# Patient Record
(demographics unavailable — no encounter records)

---

## 2024-11-14 NOTE — DISCUSSION/SUMMARY
[FreeTextEntry1] : Symptomatic treatment of fever and/or pain discussed Stat strep test ordered Throat culture, if POSITIVE, give Amoxicillin 400/5 1 tsp BID x 10 days Hydrate well Handwashing and infection control discussed Return to office if symptoms persist, worsen or fevers persist

## 2024-11-14 NOTE — PHYSICAL EXAM
[NL] : warm, clear [Exudate] : no exudate [de-identified] : mild erythema [de-identified] : L AC LN non tender

## 2024-11-14 NOTE — HISTORY OF PRESENT ILLNESS
[de-identified] : per dad pt has been having fevers for the past few days, afebrile in office  [FreeTextEntry6] : Fever to 101 x 3 days Sore throat yesterday No Cough or nasal congestion Denies chest pain or SOB Normal appetite Normal UOP No vomiting, No diarrhea No known covid contacts

## 2025-04-05 NOTE — PLAN
[TextEntry] : Continue balanced diet with all food groups. Brush teeth twice a day with toothbrush. Recommend visit to dentist. Help child to maintain consistent daily routines and sleep schedule. School discussed. Ensure home is safe. Teach child about personal safety. Use consistent, positive discipline. Limit screen time to no more than 2 hours per day. Encourage physical activity. Child needs to ride in a belt-positioning booster seat until  4 feet 9 inches has been reached and are between 8 and 12 years of age.  Can participate in all age related sports without restriction.  Return 1 year for routine well child check, sooner if concerns. 5-2-1-0 form reviewed, care coordination reviewed consider outpatient speech if d/c at school

## 2025-04-05 NOTE — HISTORY OF PRESENT ILLNESS
[Mother] : mother [Yes] : Patient goes to dentist yearly [Toothpaste] : Primary Fluoride Source: Toothpaste [No] : Not at  exposure [Water heater temperature set at <120 degrees F] : Water heater temperature set at <120 degrees F [Car seat in back seat] : Car seat in back seat [Carbon Monoxide Detectors] : Carbon monoxide detectors [Smoke Detectors] : Smoke detectors [Supervised outdoor play] : Supervised outdoor play [NO] : No [Exposure to electronic nicotine delivery system] : No exposure to electronic nicotine delivery system [FreeTextEntry7] : 6yr well visit  [FreeTextEntry1] : lives with parents in grade  doing well in school, likes teacher, has friends, no bullying no problems in school identified, no ADHD concerns participates in piano no history of injury  and  patient is doing well - has no concerns or issues. appetite good, eats variety of foods, 3 meals a day and snacks, consumes fruits, vegetables, meat, dairy no sleep concerns, goes to dentist regularly, brushing teeth 1-2 x a day (tries 2 x a day) patient not having any fevers without a cause, pain that wakes them in the night, or night sweats. Urinating and stooling normally, no chest pain, palpitations or syncope with exercise. Parent(s) have no current concerns or issues speech services but they are going to drop him next year

## 2025-04-05 NOTE — PHYSICAL EXAM
[TextEntry] : General: awake, alert, no acute distress, interactive, cooperative, appropriate for age Head: normocephalic, no signs injury Eyes: + red reflex bilaterally, EOMI, no purulent discharge, no conjunctival or scleral erythema Ears: tympanic membranes normal appearing bilaterally, no ear pit or tag Nose: no discharge Mouth: mucosa moist and pink, oropharynx without erythema Neck: supple, FROM Lungs: clear to auscultation bilaterally, no accessory muscle use Cardiac: normal S1 S2, regular rate and rhythm, no murmur appreciated Abdomen: soft, non tender, non distended, no hepatosplenomegaly  Chest: no gynecomastia Genitals: marly 1 normal appearing male genitalia, testicles descended bilaterally Lymphatics: no abnormal lymph nodes palpated Back: no scoliosis noted Skin: no rash, no lesions